# Patient Record
(demographics unavailable — no encounter records)

---

## 2024-12-16 NOTE — DISCUSSION/SUMMARY
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  Recommend increase the dose to 2 tablets of 10 /100 carbidopa at 7 AM 11 AM 3 PM and 7 PM daily on empty stomach and follow-up.

## 2024-12-16 NOTE — PHYSICAL EXAM
[General Appearance - Alert] : alert [General Appearance - In No Acute Distress] : in no acute distress [Person] : oriented to person [Place] : oriented to place [Time] : oriented to time [Short Term Intact] : short term memory impaired [Span Intact] : the attention span was decreased [Concentration Intact] : normal concentrating ability [Naming Objects] : difficulty naming common objects [Repeating Phrases] : difficulty repeating a phrase [Fluency] : fluency not intact [Comprehension] : comprehension intact [Vocabulary] : inadequate range of vocabulary [Cranial Nerves Optic (II)] : visual acuity intact bilaterally,  visual fields full to confrontation, pupils equal round and reactive to light [Cranial Nerves Oculomotor (III)] : extraocular motion intact [Cranial Nerves Trigeminal (V)] : facial sensation intact symmetrically [Cranial Nerves Facial (VII)] : face symmetrical [Cranial Nerves Vestibulocochlear (VIII)] : hearing was intact bilaterally [Cranial Nerves Glossopharyngeal (IX)] : tongue and palate midline [Cranial Nerves Accessory (XI - Cranial And Spinal)] : head turning and shoulder shrug symmetric [Cranial Nerves Hypoglossal (XII)] : there was no tongue deviation with protrusion [Motor Strength] : muscle strength was normal in all four extremities [Motor Strength Upper Extremities Bilaterally] : strength was normal in both upper extremities [Motor Strength Lower Extremities Bilaterally] : strength was normal in both lower extremities [Sensation Tactile Decrease] : light touch was intact [Sensation Pain / Temperature Decrease] : pain and temperature was intact [Sensation Vibration Decrease] : vibration was intact [Limited Balance] : the patient's balance was impaired [Dysdiadochokinesia Bilaterally] : present bilaterally [Coordination - Dysmetria Impaired Finger-to-Nose Bilateral] : not present [Coordination - Dysmetria Impaired Heel-to-Shin Bilateral] : not present [2+] : Patella left 2+ [1+] : Ankle jerk left 1+ [Plantar Reflex Right Only] : normal on the right [Plantar Reflex Left Only] : normal on the left [FreeTextEntry6] : Cogwheel rigidity [Sclera] : the sclera and conjunctiva were normal [PERRL With Normal Accommodation] : pupils were equal in size, round, reactive to light, with normal accommodation [Extraocular Movements] : extraocular movements were intact [Outer Ear] : the ears and nose were normal in appearance [Oropharynx] : the oropharynx was normal [Neck Appearance] : the appearance of the neck was normal [Neck Cervical Mass (___cm)] : no neck mass was observed [Jugular Venous Distention Increased] : there was no jugular-venous distention [Thyroid Diffuse Enlargement] : the thyroid was not enlarged [Thyroid Nodule] : there were no palpable thyroid nodules [Auscultation Breath Sounds / Voice Sounds] : lungs were clear to auscultation bilaterally [Heart Rate And Rhythm] : heart rate was normal and rhythm regular [Heart Sounds] : normal S1 and S2 [Heart Sounds Gallop] : no gallops [Murmurs] : no murmurs [Heart Sounds Pericardial Friction Rub] : no pericardial rub [Full Pulse] : the pedal pulses are present [Edema] : there was no peripheral edema [Bowel Sounds] : normal bowel sounds [Abdomen Soft] : soft [Abdomen Tenderness] : non-tender [] : no hepato-splenomegaly [Abdomen Mass (___ Cm)] : no abdominal mass palpated [No CVA Tenderness] : no ~M costovertebral angle tenderness [No Spinal Tenderness] : no spinal tenderness [Stooped] : stooped [Shuffling] : shuffling

## 2024-12-16 NOTE — HISTORY OF PRESENT ILLNESS
[FreeTextEntry1] : His symptoms of tremor began in 2018 or 19.  Initially his symptoms were variable.  Tremor was noticeably worse when stressed.  It affects distal hands and in the beginning he was able to steady 1 hand with the other when writing or holding objects.  As the symptoms progressed this ability to control 1 hand with the other worsened.  The tremors are present at rest.  He denies choking when he eats but admits drooling when he is drinking liquids.  He is currently on primidone 50 mg 2 tablets twice a day and carbidopa levodopa 25/100 , one tablet at 7 AM 11 AM 3 PM and 7 PM.  He walks independently.  He requires assistance with putting on clothes and taking off clothes or else it takes him a very long time.  He has difficulty with bed mobility. 12/16/2024: Worsening all symptoms  García ADL: Bathing -1; dressing-1; Toileting -1; Transferring- 1; Continence: 0; Feeding -0: Total : 5 /6 A.. Ability to use telephone: Dials few well-known numbers1: B.  Shopping:  Completely unable to shop  0: C.  Food preparation ; plants, prepares and serves adequate meals independently space 1: Prepares adequate meals if supplied with ingredients 0: He eats, serves and prepares meals, or prepares meals only, or prepares meals but does not maintain adequate diet e 0: Needs to have meals prepared and served  0: D.  Housekeeping:: Performs light daily tasks but cannot maintain acceptable level of cleanliness space 1: Needs help with all home maintenance tasks  1: E. Laundry :; all laundry must be done by others 0; F.  Mode of transportation:  travel limited to taxi or automobile with assistance of another 0;  G. Responsibility for own medications: is not capable of dispensing own medication 0; H.  Ability to handle finances:  incapable of handling money 0;  SCORE  __/8

## 2025-06-21 NOTE — PHYSICAL EXAM
[Cranial Nerves Oculomotor (III)] : extraocular motion intact [Cranial Nerves Optic (II)] : visual acuity intact bilaterally,  visual fields full to confrontation, pupils equal round and reactive to light [Cranial Nerves Trigeminal (V)] : facial sensation intact symmetrically [Cranial Nerves Facial (VII)] : face symmetrical [Finger Rub Not [place]] : finger rub was not heard [Motor Strength] : muscle strength was normal in all four extremities [Motor Handedness Right-Handed] : the patient is right hand dominant [Sensation Tactile Decrease] : light touch was intact [Sensation Vibration Decrease] : vibration was intact [Sensation Pain / Temperature Decrease] : pain and temperature was intact [Proprioception] : proprioception was intact [Limited Balance] : the patient's balance was impaired [Dysdiadochokinesia Bilaterally] : present bilaterally [2+] : Biceps left 2+ [1+] : Ankle jerk left 1+ [Sclera] : the sclera and conjunctiva were normal [PERRL With Normal Accommodation] : pupils were equal in size, round, reactive to light, with normal accommodation [Extraocular Movements] : extraocular movements were intact [Hearing Loss Right Only] : diminished [Hearing Loss Left Only] : dimished [Neck Appearance] : the appearance of the neck was normal [Neck Cervical Mass (___cm)] : no neck mass was observed [Jugular Venous Distention Increased] : there was no jugular-venous distention [Thyroid Diffuse Enlargement] : the thyroid was not enlarged [Thyroid Nodule] : there were no palpable thyroid nodules [Auscultation Breath Sounds / Voice Sounds] : lungs were clear to auscultation bilaterally [Heart Rate And Rhythm] : heart rate was normal and rhythm regular [Heart Sounds] : normal S1 and S2 [Murmurs] : no murmurs [Heart Sounds Gallop] : no gallops [Heart Sounds Pericardial Friction Rub] : no pericardial rub [Full Pulse] : the pedal pulses are present [Edema] : there was no peripheral edema [Bowel Sounds] : normal bowel sounds [Abdomen Soft] : soft [Abdomen Tenderness] : non-tender [Abdomen Mass (___ Cm)] : no abdominal mass palpated [] : no hepato-splenomegaly [No CVA Tenderness] : no ~M costovertebral angle tenderness [No Spinal Tenderness] : no spinal tenderness [Abnormal Walk] : normal gait [Nail Clubbing] : no clubbing  or cyanosis of the fingernails [Musculoskeletal - Swelling] : no joint swelling seen [Motor Tone] : muscle strength and tone were normal [Paresis Pronator Drift Right-Sided] : no pronator drift on the right [Paresis Pronator Drift Left-Sided] : no pronator drift on the left [Romberg's Sign] : Romberg's sign was negtive [Coordination - Dysmetria Impaired Finger-to-Nose Bilateral] : not present [Coordination - Dysmetria Impaired Heel-to-Shin Bilateral] : not present [Plantar Reflex Right Only] : normal on the right [Plantar Reflex Left Only] : normal on the left [FreeTextEntry9] : 6/16/2025 [de-identified] :  [de-identified] : 4 hours [FreeTextEntry1] : 2 [FreeTextEntry3] : 14 [FreeTextEntry5] : 27 [FreeTextEntry7] : 9

## 2025-06-21 NOTE — REVIEW OF SYSTEMS
[Sleep Disturbances] : sleep disturbances [Depression] : depression [Change In Personality] : personality change [Memory Lapses or Loss] : memory loss [Decr. Concentrating Ability] : decreased concentrating ability [Difficulty with Language] : ~M difficulty with language [Repeating Questions] : repeated questioning about recent events [Arm Weakness] : arm weakness [Hand Weakness] :  hand weakness [Leg Weakness] : leg weakness [Poor Coordination] : poor coordination [Difficulty Writing] : difficulty writing [Difficulties in Speech] : speech difficulties [Dizziness] : dizziness [Lightheadedness] : lightheadedness [Difficulty Walking] : difficulty walking [Frequent Falls] : frequent falls [Loss Of Hearing] : hearing loss [Incontinence] : incontinence [Negative] : Respiratory [Suicidal] : not suicidal [Anxiety] : no anxiety [Emotional Problems] : no emotional problems [Confused or Disoriented] : no confusion [Changed Thought Patterns] : no change in thought patterns [Facial Weakness] : no facial weakness [Numbness] : no numbness [Tingling] : no tingling [Seizures] : no convulsions [Fainting] : no fainting [Vertigo] : no vertigo [Migraine Headache] : no migraine headache [Inability to Walk] : able to walk [Ataxia] : no ataxia [Limping] : not limping [Constipation] : no constipation [Diarrhea] : no diarrhea

## 2025-06-21 NOTE — END OF VISIT
[Time Spent: ___ minutes] : I have spent [unfilled] minutes of time on the encounter which excludes teaching and separately reported services. [FreeTextEntry3] : I certify the time spent in reviewing the images of MRI scans, other records, request and review of records from other physicians and explanation of his condition and treatment and management including explanation of DBS - its value in improving PD control, the use and complications of levodopa treatment.

## 2025-06-21 NOTE — DATA REVIEWED
[de-identified] : Hospital Course: Discharge Date 10-Royal-2025 Admission Date 07-Jun-2025 16:40 Reason for Admission Intractable diarrhea Hospital Course 77-year-old male, AAox3, ambulates independently, HHA 8 hours a day with history of Parkinson HLD, HTN, allergic to aspirin with hives comes in for intractable diarrhea. Wbc 17.50. ct a/p: Findings compatible with acute colitis. Mildly dilated gallbladder with small intraluminal calculi. Enlarged prostate. Slight urinary bladder wall thickening. Patient admitted for intractable diarrhea 2/2 acute colitis. Pt. treated with oral Vanco and IV Flagyl, followed by ID Dr. Chou. Pt. with no further diarrhea at this time, diet advanced to DASH and tolerated. Pt. is HD stable, afebrile with no leukocytosis, medically stable for discharge to home.  Med Reconciliation: Medication Reconciliation Status Admission Reconciliation is Completed Discharge Reconciliation is Completed Discharge Medications acetaminophen 325 mg oral tablet: 2 tab(s) orally every 6 hours As needed Temp greater or equal to 38C (100.4F), Mild Pain (1 - 3) aluminum hydroxide-magnesium hydroxide 200 mg-200 mg/5 mL oral suspension: 30 milliliter(s) orally every 4 hours as needed for Dyspepsia atorvastatin 20 mg oral tablet: 1 tab(s) orally once a day losartan 100 mg oral tablet: 1 tab(s) orally once a day Mysoline 50 mg oral tablet: 1 tab(s) orally 2 times a day Sinemet 10 mg-100 mg oral tablet: 2 tab(s) orally 4 times a day 7,11,3,7 vancomycin 125 mg oral capsule: 1 cap(s) orally every 6 hours , , Care Plan/Procedures: Discharge Diagnoses, Assessment and Plan of Treatment PRINCIPAL DISCHARGE DIAGNOSIS Diagnosis: Colitis Assessment and Plan of Treatment: You presented to ED with intractable diarrhea associated with abdominal discomfort and elevated white blood count. CT scan of your abdomen showed colitis which is an inflammation in the colon, and your stool tested positive for bacteria named C. Difficille. You were followed by infectious disease doctor and treated with antibiotics. Your symptoms have greatly improved with no further diarrhea. -Please complete antibiotic course as prescribed -Please follow up with your PCP within one week   SECONDARY DISCHARGE DIAGNOSES Diagnosis: C. difficile colitis Assessment and Plan of Treatment: as above. Please be advised that this infection is highly contageous. -Wash hands frequently -Clean toilets frequently  Diagnosis: Parkinson disease Assessment and Plan of Treatment: You are noted with Parkinsonian tremors particularly head tremors. _continue Parkinsons medications as prior to hospitalization -Safety precautions -Follow up with PCP and neurology  Diagnosis: Hypomagnesemia Assessment and Plan of Treatment: Your serum magnesium was low due to diarrhea. It was supplemented and normalized. -Follow up with your PCP for continued electrolytes monitorind  Diagnosis: Hypophosphatemia Assessment and Plan of Treatment: Your serum phosphate was low due to diarrhea -Follow up with your PCP for continued electrolytes monitoring Goal(s) To get better and follow your care plan as instructed. Follow Up: Care Providers for Follow up (PCP/Outpatient Provider) [facility] [address] [place] [id number] Phone: [phone] Fax: [phone] Follow Up Time: 1 week Additional Provider Info (For SysAdmin Use Only) PROVIDER:[TOKEN:[[id number]],FOLLOWUP:[1 week]] Care Providers Direct Addresses (For SYSAdmin Use Only) ,DirectAddress_Unknown NPI number (For SysAdmin Use Only) : [[id number]] Discharge Diet DASH Diet Activity No restrictions Quality Measures: Does the patient have difficulty running errands alone like visiting a doctors office or shopping? No Does the patient have difficulty climbing stairs? No Cognition: The patient has No difficulties Patient Condition Stable Hospice Patient No Core Measure Site Yes Does the patient have a principal diagnosis of ischemic stroke, hemorrhagic stroke, or TIA? No Does the patient have a principal diagnosis of Acute Myocardial Infarction? No Has the patient had a Percutaneous Coronary Intervention? No Tobacco Usage Within the Last Year No Home Health: Discharged with Home Health Care Services? Yes Face-To-Face Contact As certified below, I, or a nurse practitioner or physician assistant working with me, had a face-to-face encounter that meets the physician face-to-face encounter requirements. Need for Skilled Services Observation and assessment Rehabilitation services Based on the above findings, the following intermittent skilled services are medically necessary home health services: Nursing Physical therapy Home Bound Status Fall risk Patient Needs Assistance to Leave Residence... Attending Certification My signature below certifies that the above stated patient is homebound and upon completion of the Face-To-Face encounter, has the need for intermittent skilled nursing, physical therapy and/or speech or occupational therapy services in their home for their current diagnosis as outlined in their initial plan of care. These services will continue to be monitored by myself or another physician. Encounter Date 10-Royal-2025 Document Complete: Care Provider Seen in Hospital Perrin, Richard S Amin, Mohsena F Physician Section Complete This document is complete and the patient is ready for discharge. For questions about your prescriptions, please call: [phone] Is this contact telephone number correct? Yes Attending Attestation Statement I have personally seen and examined the patient. I have collaborated with and supervised the . on the discharge service for the patient. I have reviewed and made amendments to the documentation where necessary.   Electronic Signatures: Lesia Huffman (NP) (Signed 10-Royal-2025 14:56) Authored: Hospital Course, Med Reconciliation, Care Plan/Procedures, Follow Up, Quality Measures, Home Health, Document Complete Reji Murphy) (Signed 12-Jun-2025 07:40) Co-Signer: Hospital Course, Med Reconciliation, Care Plan/Procedures, Follow Up, Quality Measures, Home Health, Document Complete  Last Updated: 12-Jun-2025 07:40 by Reji Murphy)

## 2025-06-21 NOTE — DISCUSSION/SUMMARY
[FreeTextEntry1] : dopamine dyskinesia and increased tremor at other times. Recommend more frequent dosing for increased tremor and consult Dr Landeros for DBS evaluation Investigate other causes of dementia: repeat MRI and ordered PET scan. Explained cause of dementia connection to FTD and AD as well as PD. Importance of distinguishing between FTD and AD with PET scan. Based on MRI PET and other clinical parameters he may not be suitable candidate for DBS.

## 2025-06-21 NOTE — HISTORY OF PRESENT ILLNESS
[FreeTextEntry1] : His symptoms of tremor began in 2018 or . Initially his symptoms were variable. Tremor was noticeably worse when stressed. It affects distal hands and in the beginning he was able to steady 1 hand with the other when writing or holding objects. As the symptoms progressed this ability to control 1 hand with the other worsened. The tremors are present at rest. He denies choking when he eats but admits drooling when he is drinking liquids.  He is currently on primidone 50 mg 2 tablets twice a day and carbidopa levodopa 25/100 , one tablet at 7 AM 11 AM 3 PM and 7 PM. He walks independently. He requires assistance with putting on clothes and taking off clothes or else it takes him a very long time. He has difficulty with bed mobility.  2024: Worsening all symptoms García ADL: Bathing -1; dressing-1; Toileting -1; Transferring- 1; Continence: 0; Feeding -0: Total : 5 /6  A.. Ability to use telephone: Dials few well-known numbers1: B. Shopping: Completely unable to shop 0: C. Food preparation ; plants, prepares and serves adequate meals independently space 1: Prepares adequate meals if supplied with ingredients 0: He eats, serves and prepares meals, or prepares meals only, or prepares meals but does not maintain adequate diet e 0: Needs to have meals prepared and served 0: D. Housekeeping:: Performs light daily tasks but cannot maintain acceptable level of cleanliness space 1: Needs help with all home maintenance tasks 1: E. Laundry :; all laundry must be done by others 0; F. Mode of transportation: travel limited to taxi or automobile with assistance of another 0; G. Responsibility for own medications: is not capable of dispensing own medication 0; H. Ability to handle finances: incapable of handling money 0; SCORE 2/8  2025 -discharged on 6/10/2025 from [facility] where he had been admitted with abdominal pain diarrhea confusion disorientation.  He was found to have C. difficile colitis, hypomagnesemia and hypophosphatemia due to diarrhea.  His symptoms and signs were controlled and his mental status improved.  This is hospital follow-up.  García Index of [place] in Activities of Daily Livin. Bathing/Showerin 2. Dressin 3. Toiletin 4. Transferrin 5. Continence: 1 6: Feedin  TOTAL: 4/6   [place] Instrumental Activities of Daily Living:   A. Ability to Use Telephone: 0 B. Shoppin C. Food Preparation: 0 D. Housekeepin E. Laundry: 0 F. Transportation: 0 G. Responsibility for Own Medications: 0 H: Ability to Handle Finances:    TOTAL: 0

## 2025-06-21 NOTE — PHYSICAL EXAM
[Cranial Nerves Oculomotor (III)] : extraocular motion intact [Cranial Nerves Optic (II)] : visual acuity intact bilaterally,  visual fields full to confrontation, pupils equal round and reactive to light [Cranial Nerves Trigeminal (V)] : facial sensation intact symmetrically [Cranial Nerves Facial (VII)] : face symmetrical [Finger Rub Not [place]] : finger rub was not heard [Motor Strength] : muscle strength was normal in all four extremities [Motor Handedness Right-Handed] : the patient is right hand dominant [Sensation Tactile Decrease] : light touch was intact [Sensation Vibration Decrease] : vibration was intact [Sensation Pain / Temperature Decrease] : pain and temperature was intact [Proprioception] : proprioception was intact [Limited Balance] : the patient's balance was impaired [Dysdiadochokinesia Bilaterally] : present bilaterally [2+] : Biceps left 2+ [1+] : Ankle jerk left 1+ [Sclera] : the sclera and conjunctiva were normal [PERRL With Normal Accommodation] : pupils were equal in size, round, reactive to light, with normal accommodation [Extraocular Movements] : extraocular movements were intact [Hearing Loss Right Only] : diminished [Hearing Loss Left Only] : dimished [Neck Appearance] : the appearance of the neck was normal [Neck Cervical Mass (___cm)] : no neck mass was observed [Jugular Venous Distention Increased] : there was no jugular-venous distention [Thyroid Diffuse Enlargement] : the thyroid was not enlarged [Thyroid Nodule] : there were no palpable thyroid nodules [Auscultation Breath Sounds / Voice Sounds] : lungs were clear to auscultation bilaterally [Heart Rate And Rhythm] : heart rate was normal and rhythm regular [Heart Sounds] : normal S1 and S2 [Murmurs] : no murmurs [Heart Sounds Gallop] : no gallops [Heart Sounds Pericardial Friction Rub] : no pericardial rub [Full Pulse] : the pedal pulses are present [Edema] : there was no peripheral edema [Bowel Sounds] : normal bowel sounds [Abdomen Soft] : soft [Abdomen Tenderness] : non-tender [] : no hepato-splenomegaly [Abdomen Mass (___ Cm)] : no abdominal mass palpated [No CVA Tenderness] : no ~M costovertebral angle tenderness [No Spinal Tenderness] : no spinal tenderness [Abnormal Walk] : normal gait [Nail Clubbing] : no clubbing  or cyanosis of the fingernails [Musculoskeletal - Swelling] : no joint swelling seen [Motor Tone] : muscle strength and tone were normal [Paresis Pronator Drift Right-Sided] : no pronator drift on the right [Paresis Pronator Drift Left-Sided] : no pronator drift on the left [Romberg's Sign] : Romberg's sign was negtive [Coordination - Dysmetria Impaired Finger-to-Nose Bilateral] : not present [Coordination - Dysmetria Impaired Heel-to-Shin Bilateral] : not present [Plantar Reflex Right Only] : normal on the right [Plantar Reflex Left Only] : normal on the left [FreeTextEntry9] : 6/16/2025 [de-identified] :  [de-identified] : 4 hours [FreeTextEntry1] : 2 [FreeTextEntry3] : 14 [FreeTextEntry5] : 27 [FreeTextEntry7] : 9

## 2025-06-21 NOTE — HISTORY OF PRESENT ILLNESS
[FreeTextEntry1] : His symptoms of tremor began in 2018 or . Initially his symptoms were variable. Tremor was noticeably worse when stressed. It affects distal hands and in the beginning he was able to steady 1 hand with the other when writing or holding objects. As the symptoms progressed this ability to control 1 hand with the other worsened. The tremors are present at rest. He denies choking when he eats but admits drooling when he is drinking liquids.  He is currently on primidone 50 mg 2 tablets twice a day and carbidopa levodopa 25/100 , one tablet at 7 AM 11 AM 3 PM and 7 PM. He walks independently. He requires assistance with putting on clothes and taking off clothes or else it takes him a very long time. He has difficulty with bed mobility.  2024: Worsening all symptoms García ADL: Bathing -1; dressing-1; Toileting -1; Transferring- 1; Continence: 0; Feeding -0: Total : 5 /6  A.. Ability to use telephone: Dials few well-known numbers1: B. Shopping: Completely unable to shop 0: C. Food preparation ; plants, prepares and serves adequate meals independently space 1: Prepares adequate meals if supplied with ingredients 0: He eats, serves and prepares meals, or prepares meals only, or prepares meals but does not maintain adequate diet e 0: Needs to have meals prepared and served 0: D. Housekeeping:: Performs light daily tasks but cannot maintain acceptable level of cleanliness space 1: Needs help with all home maintenance tasks 1: E. Laundry :; all laundry must be done by others 0; F. Mode of transportation: travel limited to taxi or automobile with assistance of another 0; G. Responsibility for own medications: is not capable of dispensing own medication 0; H. Ability to handle finances: incapable of handling money 0; SCORE 2/8  2025 -discharged on 6/10/2025 from [facility] where he had been admitted with abdominal pain diarrhea confusion disorientation.  He was found to have C. difficile colitis, hypomagnesemia and hypophosphatemia due to diarrhea.  His symptoms and signs were controlled and his mental status improved.  This is hospital follow-up.  García Index of [place] in Activities of Daily Livin. Bathing/Showerin 2. Dressin 3. Toiletin 4. Transferrin 5. Continence: 1 6: Feedin  TOTAL: 4/6   [place] Instrumental Activities of Daily Living:   A. Ability to Use Telephone: 0 B. Shoppin C. Food Preparation: 0 D. Housekeepin E. Laundry: 0 F. Transportation: 0 G. Responsibility for Own Medications: 0 H: Ability to Handle Finances:    TOTAL: 0

## 2025-06-21 NOTE — DATA REVIEWED
[de-identified] : Hospital Course: Discharge Date 10-Royal-2025 Admission Date 07-Jun-2025 16:40 Reason for Admission Intractable diarrhea Hospital Course 77-year-old male, AAox3, ambulates independently, HHA 8 hours a day with history of Parkinson HLD, HTN, allergic to aspirin with hives comes in for intractable diarrhea. Wbc 17.50. ct a/p: Findings compatible with acute colitis. Mildly dilated gallbladder with small intraluminal calculi. Enlarged prostate. Slight urinary bladder wall thickening. Patient admitted for intractable diarrhea 2/2 acute colitis. Pt. treated with oral Vanco and IV Flagyl, followed by ID Dr. Chou. Pt. with no further diarrhea at this time, diet advanced to DASH and tolerated. Pt. is HD stable, afebrile with no leukocytosis, medically stable for discharge to home.  Med Reconciliation: Medication Reconciliation Status Admission Reconciliation is Completed Discharge Reconciliation is Completed Discharge Medications acetaminophen 325 mg oral tablet: 2 tab(s) orally every 6 hours As needed Temp greater or equal to 38C (100.4F), Mild Pain (1 - 3) aluminum hydroxide-magnesium hydroxide 200 mg-200 mg/5 mL oral suspension: 30 milliliter(s) orally every 4 hours as needed for Dyspepsia atorvastatin 20 mg oral tablet: 1 tab(s) orally once a day losartan 100 mg oral tablet: 1 tab(s) orally once a day Mysoline 50 mg oral tablet: 1 tab(s) orally 2 times a day Sinemet 10 mg-100 mg oral tablet: 2 tab(s) orally 4 times a day 7,11,3,7 vancomycin 125 mg oral capsule: 1 cap(s) orally every 6 hours , , Care Plan/Procedures: Discharge Diagnoses, Assessment and Plan of Treatment PRINCIPAL DISCHARGE DIAGNOSIS Diagnosis: Colitis Assessment and Plan of Treatment: You presented to ED with intractable diarrhea associated with abdominal discomfort and elevated white blood count. CT scan of your abdomen showed colitis which is an inflammation in the colon, and your stool tested positive for bacteria named C. Difficille. You were followed by infectious disease doctor and treated with antibiotics. Your symptoms have greatly improved with no further diarrhea. -Please complete antibiotic course as prescribed -Please follow up with your PCP within one week   SECONDARY DISCHARGE DIAGNOSES Diagnosis: C. difficile colitis Assessment and Plan of Treatment: as above. Please be advised that this infection is highly contageous. -Wash hands frequently -Clean toilets frequently  Diagnosis: Parkinson disease Assessment and Plan of Treatment: You are noted with Parkinsonian tremors particularly head tremors. _continue Parkinsons medications as prior to hospitalization -Safety precautions -Follow up with PCP and neurology  Diagnosis: Hypomagnesemia Assessment and Plan of Treatment: Your serum magnesium was low due to diarrhea. It was supplemented and normalized. -Follow up with your PCP for continued electrolytes monitorind  Diagnosis: Hypophosphatemia Assessment and Plan of Treatment: Your serum phosphate was low due to diarrhea -Follow up with your PCP for continued electrolytes monitoring Goal(s) To get better and follow your care plan as instructed. Follow Up: Care Providers for Follow up (PCP/Outpatient Provider) [facility] [address] [place] [id number] Phone: [phone] Fax: [phone] Follow Up Time: 1 week Additional Provider Info (For SysAdmin Use Only) PROVIDER:[TOKEN:[[id number]],FOLLOWUP:[1 week]] Care Providers Direct Addresses (For SYSAdmin Use Only) ,DirectAddress_Unknown NPI number (For SysAdmin Use Only) : [[id number]] Discharge Diet DASH Diet Activity No restrictions Quality Measures: Does the patient have difficulty running errands alone like visiting a doctors office or shopping? No Does the patient have difficulty climbing stairs? No Cognition: The patient has No difficulties Patient Condition Stable Hospice Patient No Core Measure Site Yes Does the patient have a principal diagnosis of ischemic stroke, hemorrhagic stroke, or TIA? No Does the patient have a principal diagnosis of Acute Myocardial Infarction? No Has the patient had a Percutaneous Coronary Intervention? No Tobacco Usage Within the Last Year No Home Health: Discharged with Home Health Care Services? Yes Face-To-Face Contact As certified below, I, or a nurse practitioner or physician assistant working with me, had a face-to-face encounter that meets the physician face-to-face encounter requirements. Need for Skilled Services Observation and assessment Rehabilitation services Based on the above findings, the following intermittent skilled services are medically necessary home health services: Nursing Physical therapy Home Bound Status Fall risk Patient Needs Assistance to Leave Residence... Attending Certification My signature below certifies that the above stated patient is homebound and upon completion of the Face-To-Face encounter, has the need for intermittent skilled nursing, physical therapy and/or speech or occupational therapy services in their home for their current diagnosis as outlined in their initial plan of care. These services will continue to be monitored by myself or another physician. Encounter Date 10-Royal-2025 Document Complete: Care Provider Seen in Hospital Perrin, Richard S Amin, Mohsena F Physician Section Complete This document is complete and the patient is ready for discharge. For questions about your prescriptions, please call: [phone] Is this contact telephone number correct? Yes Attending Attestation Statement I have personally seen and examined the patient. I have collaborated with and supervised the . on the discharge service for the patient. I have reviewed and made amendments to the documentation where necessary.   Electronic Signatures: Lesia Huffman (NP) (Signed 10-Royal-2025 14:56) Authored: Hospital Course, Med Reconciliation, Care Plan/Procedures, Follow Up, Quality Measures, Home Health, Document Complete Reji Murphy) (Signed 12-Jun-2025 07:40) Co-Signer: Hospital Course, Med Reconciliation, Care Plan/Procedures, Follow Up, Quality Measures, Home Health, Document Complete  Last Updated: 12-Jun-2025 07:40 by Reji Murphy)